# Patient Record
Sex: FEMALE | Race: WHITE | NOT HISPANIC OR LATINO | URBAN - METROPOLITAN AREA
[De-identification: names, ages, dates, MRNs, and addresses within clinical notes are randomized per-mention and may not be internally consistent; named-entity substitution may affect disease eponyms.]

---

## 2020-07-15 ENCOUNTER — LAB OUTSIDE AN ENCOUNTER (OUTPATIENT)
Dept: URBAN - METROPOLITAN AREA CLINIC 105 | Facility: CLINIC | Age: 75
End: 2020-07-15

## 2020-07-15 ENCOUNTER — OFFICE VISIT (OUTPATIENT)
Dept: URBAN - METROPOLITAN AREA CLINIC 105 | Facility: CLINIC | Age: 75
End: 2020-07-15
Payer: COMMERCIAL

## 2020-07-15 DIAGNOSIS — E53.8 VITAMIN B12 DEFICIENCY: ICD-10-CM

## 2020-07-15 DIAGNOSIS — K52.9 CHRONIC DIARRHEA: ICD-10-CM

## 2020-07-15 DIAGNOSIS — D64.9 NORMOCYTIC ANEMIA: ICD-10-CM

## 2020-07-15 DIAGNOSIS — K59.01 CONSTIPATION: ICD-10-CM

## 2020-07-15 DIAGNOSIS — Z90.49 HISTORY OF CHOLECYSTECTOMY: ICD-10-CM

## 2020-07-15 DIAGNOSIS — K21.0 GERD WITH ESOPHAGITIS: ICD-10-CM

## 2020-07-15 DIAGNOSIS — I07.1 TRICUSPID VALVE REGURGITATION: ICD-10-CM

## 2020-07-15 DIAGNOSIS — Z98.84 HISTORY OF ROUX-EN-Y GASTRIC BYPASS: ICD-10-CM

## 2020-07-15 DIAGNOSIS — K63.5 COLON POLYPS: ICD-10-CM

## 2020-07-15 DIAGNOSIS — E55.9 VITAMIN D DEFICIENCY: ICD-10-CM

## 2020-07-15 PROCEDURE — 85044 MANUAL RETICULOCYTE COUNT: CPT | Performed by: INTERNAL MEDICINE

## 2020-07-15 PROCEDURE — 82607 VITAMIN B-12: CPT | Performed by: INTERNAL MEDICINE

## 2020-07-15 PROCEDURE — G8417 CALC BMI ABV UP PARAM F/U: HCPCS | Performed by: INTERNAL MEDICINE

## 2020-07-15 PROCEDURE — 3017F COLORECTAL CA SCREEN DOC REV: CPT | Performed by: INTERNAL MEDICINE

## 2020-07-15 PROCEDURE — 99214 OFFICE O/P EST MOD 30 MIN: CPT | Performed by: INTERNAL MEDICINE

## 2020-07-15 PROCEDURE — G9903 PT SCRN TBCO ID AS NON USER: HCPCS | Performed by: INTERNAL MEDICINE

## 2020-07-15 PROCEDURE — G8427 DOCREV CUR MEDS BY ELIG CLIN: HCPCS | Performed by: INTERNAL MEDICINE

## 2020-07-15 RX ORDER — SODIUM, POTASSIUM,MAG SULFATES 17.5-3.13G
177 ML AS DIRECTED SOLUTION, RECONSTITUTED, ORAL ORAL ONCE
Qty: 1 | Refills: 0 | OUTPATIENT
Start: 2020-07-15 | End: 2020-07-16

## 2020-07-15 RX ORDER — MELATONIN 5 MG
TAKE 2-3 BY MOUTH AT NIGHT TABLET ORAL
Qty: 0 | Refills: 0 | Status: ACTIVE | COMMUNITY
Start: 1900-01-01

## 2020-07-15 NOTE — HPI-OTHER HISTORIES
The patient presents on follow-up for chronic diarrhea.  On 9/4/19, the patient presented in hospital follow-up from 6/25/19. Dr. Spencer called her and said that the antibiotics he prescribed her were not effective against her UTI, so she had to start a new regimen 2 times. She said she obtained budesonide after leaving the hospital, but did not take it. She wanted to wait until her UTI cleared up. She was on Imodium BID and Lomotil 2 pills QD after leaving. Her diarrhea was under control at that point. She went off the Imodium in mid August. She was still taking Lomotil at that point. She experienced mild diarrhea. She stayed off Imodium until last weekend when she started feeling gassy with fecal matter being released. She was feeling a lot better than when she was in the hospital. She started budesonide 3 mg TID 2 weeks ago (she called my office noting diarrhea, so I advised a trial of budesonide). Her diarrhea had worsened instead of improving. She expereienced extreme gas and heartburn. She bought Tums, but had not taken any yet. She took Imodium PRN, and Lomotil QD. She had 20 BMs/day over the weekend. She now has 3-4/day while on Imodium; however, she was having watery, nonformed, "scummy" BMs. She noticed more diarrhea when she ate things that she was not supposed to, like tomatoes and gluten.  She noted previous colonoscopies have been an inadequate prep inspite of tolerating the prep.  She was on a low gluten, low dairy, and low fiber diet.  On 10/9/19, she stated she had constant diarrhea. She had a bowel prep on the 4th. She noted no change in her BMs. She said that today was the first day that she was able to urinate w/o fecal matter coming out. She had 5-7 BMs/day. Since she stopped Imodium and Lomotil, she had diarrhea.  However, today she was better and had only 1 BM/formed.  Her last colonoscopy was in 2016. She said she was given a 3 year follow-up.  On 11/13/19, she said she couldn't take Miralax 1 cap every day because her frequency was too much. She had used it 3x since her last visit (10/23, 10/28, 10/31). She noted a result 7-9 hrs after use. She had 3-8 BMs/day. When she had a BM 8x/day, her stools were more watery and uncontrollable while 3x/day was more formed. She was seeing more formed than nonformed stool. Another disadvantage to Miralax was that it acts as a barrier to eating. 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 She still felt like progress was being made, because she was noting an increased appetite at times and her BMs reflected that. She had been having more heavy BMs that sunk to the bottom of the toilet instead of floating. She said that floating BMs were coming from food she just ate. She rarely drank hot liquids. She didn't drink coffee at all.  On 12/18/19, she says she got another UTI. She was on Macrobid, but it wasn't effective so she switched to Levaquin. She has five days left. She is seeing ID (Debbi Venegas MD). She d/c Miralax about 5 days ago at the instruction of Dr. Venegas's PA in order to see whether her looser stool was caused by Miralax or Levaquin.  She will see her again on Monday. She reports leg edema since her epidural in November. Dr. Raya thought it was related to steroid use. She has another epidural scheduled for Monday. She noted a 50% improvement in her back pain after the procedure. She reports her BMs as "little dribbles" that she isn't even aware of so she often goes to the restroom to check as a result. Sometimes she will have a significant BM, but it's mostly sludge or diarrhea. While on Miralax, she would have 4-5 BMs/day. They were either formed, sludge, or diarrhea. Her BMs were more formed while on Miralax. She also reported more evacuation and felt like her BMs came from "higher up" in her abdomen while on it. She noted less of a dribbling sensation/smear incontinence back then. She is unsure of the improvement in her urgency.  She is not taking Imodium or Lomotil. Dr. Raya advised her to get compression stocks. She is still on sprionolactone, but Dr. Raya wants her switch over to Lasix if kidney function is normal.  On 2/7/20, she noted seeing Dr. Caroline Cotter re fluid loss from diarrhea with a creat 1.6.  She was to have heart surgery re tricuspid valve regurgitation.  She was now taking Miralax 3 heaping teaspoons every 3 days and having less watery diarrhea.  She noted having 4-5 BMs/day described as "formed ribbons."  She stated she had had a dramatic improvement in her diarrhea on Miralax.  Today, she reports unpredictable BMs. She was using Miralax 2 tsp QD, but thought it was too powerful. She wouldn't take it when she went out. She decreased to 1 tsp/day, but still thinks that could be too much. She takes Miralax 3-5 days/week. She sees blood on tissue. She's had 3-4 iron infusions over the last 6 years.   Labs 8/26/19 - Hgb A1C 6.5. 8/20/19 - Creatinine 2.13, GFR 23/26.  CBC normal except for , TSH/free T4 normal.  Iron 132, TIBC 398, Iron sat 33%, ferritin 40.

## 2020-07-19 ENCOUNTER — TELEPHONE ENCOUNTER (OUTPATIENT)
Dept: URBAN - METROPOLITAN AREA CLINIC 92 | Facility: CLINIC | Age: 75
End: 2020-07-19

## 2020-07-19 LAB
A/G RATIO: 2.2
ALBUMIN: 4.7
ALKALINE PHOSPHATASE: 151
ALT (SGPT): 8
AST (SGOT): 14
BASO (ABSOLUTE): 0
BASOS: 0
BILIRUBIN, TOTAL: 0.3
BUN/CREATININE RATIO: 17
BUN: 23
C-REACTIVE PROTEIN, QUANT: <1
CALCIUM: 9.2
CARBON DIOXIDE, TOTAL: 18
CHLORIDE: 107
CREATININE: 1.32
EGFR IF AFRICN AM: 46
EGFR IF NONAFRICN AM: 40
EOS (ABSOLUTE): 0.1
EOS: 2
FERRITIN, SERUM: 13
FOLATE (FOLIC ACID), SERUM: 5.5
GLOBULIN, TOTAL: 2.1
GLUCOSE: 130
HEMATOCRIT: 27.6
HEMATOLOGY COMMENTS:: (no result)
HEMOGLOBIN: 8.4
IMMATURE CELLS: (no result)
IMMATURE GRANS (ABS): (no result)
IMMATURE GRANULOCYTES: (no result)
IRON BIND.CAP.(TIBC): 524
IRON SATURATION: 4
IRON: 20
LYMPHS (ABSOLUTE): 1.7
LYMPHS: 27
Lab: (no result)
MCH: 25.1
MCHC: 30.4
MCV: 83
METHYLMALONIC ACID, SERUM: 173
MONOCYTES(ABSOLUTE): 0.5
MONOCYTES: 8
NEUTROPHILS (ABSOLUTE): 4
NEUTROPHILS: 63
NRBC: (no result)
PLATELETS: 236
POTASSIUM: 5
PROTEIN, TOTAL: 6.8
RBC: 3.34
RDW: 16
RETICULOCYTE COUNT: 1.1
SODIUM: 140
UIBC: 504
VITAMIN B12: 566
WBC: 6.3

## 2020-08-03 ENCOUNTER — OFFICE VISIT (OUTPATIENT)
Dept: URBAN - METROPOLITAN AREA SURGERY CENTER 16 | Facility: SURGERY CENTER | Age: 75
End: 2020-08-03
Payer: COMMERCIAL

## 2020-08-03 ENCOUNTER — TELEPHONE ENCOUNTER (OUTPATIENT)
Dept: URBAN - METROPOLITAN AREA CLINIC 92 | Facility: CLINIC | Age: 75
End: 2020-08-03

## 2020-08-03 DIAGNOSIS — K31.89 ACQUIRED DEFORMITY OF PYLORUS: ICD-10-CM

## 2020-08-03 DIAGNOSIS — B37.81 CANDIDA ESOPHAGITIS: ICD-10-CM

## 2020-08-03 DIAGNOSIS — R19.4 ALTERED BOWEL HABITS: ICD-10-CM

## 2020-08-03 DIAGNOSIS — Z98.84 GASTRIC BYPASS STATUS FOR OBESITY: ICD-10-CM

## 2020-08-03 PROCEDURE — G9937 DIG OR SURV COLSCO: HCPCS | Performed by: INTERNAL MEDICINE

## 2020-08-03 PROCEDURE — G8907 PT DOC NO EVENTS ON DISCHARG: HCPCS | Performed by: INTERNAL MEDICINE

## 2020-08-03 PROCEDURE — 45380 COLONOSCOPY AND BIOPSY: CPT | Performed by: INTERNAL MEDICINE

## 2020-08-03 PROCEDURE — 43239 EGD BIOPSY SINGLE/MULTIPLE: CPT | Performed by: INTERNAL MEDICINE

## 2020-08-03 RX ORDER — MELATONIN 5 MG
TAKE 2-3 BY MOUTH AT NIGHT TABLET ORAL
Qty: 0 | Refills: 0 | Status: ACTIVE | COMMUNITY
Start: 1900-01-01

## 2020-08-03 RX ORDER — OMEPRAZOLE 40 MG/1
1 CAPSULE 30 MINUTES BEFORE MORNING MEAL CAPSULE, DELAYED RELEASE ORAL ONCE A DAY
Qty: 90 | Refills: 3 | OUTPATIENT
Start: 2020-08-03

## 2020-08-07 ENCOUNTER — TELEPHONE ENCOUNTER (OUTPATIENT)
Dept: URBAN - METROPOLITAN AREA CLINIC 92 | Facility: CLINIC | Age: 75
End: 2020-08-07

## 2020-08-07 RX ORDER — FLUCONAZOLE 100 MG/1
1 TABLET TABLET ORAL DAILY
Qty: 14 TABLET | Refills: 0 | OUTPATIENT
Start: 2020-08-07 | End: 2020-08-21

## 2020-09-03 ENCOUNTER — LAB OUTSIDE AN ENCOUNTER (OUTPATIENT)
Dept: URBAN - METROPOLITAN AREA CLINIC 105 | Facility: CLINIC | Age: 75
End: 2020-09-03

## 2020-09-03 ENCOUNTER — OFFICE VISIT (OUTPATIENT)
Dept: URBAN - METROPOLITAN AREA CLINIC 105 | Facility: CLINIC | Age: 75
End: 2020-09-03
Payer: COMMERCIAL

## 2020-09-03 DIAGNOSIS — E55.9 VITAMIN D DEFICIENCY: ICD-10-CM

## 2020-09-03 DIAGNOSIS — K63.5 COLON POLYPS: ICD-10-CM

## 2020-09-03 DIAGNOSIS — Z98.84 HISTORY OF ROUX-EN-Y GASTRIC BYPASS: ICD-10-CM

## 2020-09-03 DIAGNOSIS — K21.0 GERD WITH ESOPHAGITIS: ICD-10-CM

## 2020-09-03 DIAGNOSIS — D50.9 IRON DEFICIENCY ANEMIA, UNSPECIFIED IRON DEFICIENCY ANEMIA TYPE: ICD-10-CM

## 2020-09-03 DIAGNOSIS — K52.9 CHRONIC DIARRHEA: ICD-10-CM

## 2020-09-03 DIAGNOSIS — I07.1 TRICUSPID VALVE REGURGITATION: ICD-10-CM

## 2020-09-03 DIAGNOSIS — K59.01 CONSTIPATION: ICD-10-CM

## 2020-09-03 DIAGNOSIS — E53.8 VITAMIN B12 DEFICIENCY: ICD-10-CM

## 2020-09-03 DIAGNOSIS — Z90.49 HISTORY OF CHOLECYSTECTOMY: ICD-10-CM

## 2020-09-03 PROCEDURE — G8417 CALC BMI ABV UP PARAM F/U: HCPCS | Performed by: INTERNAL MEDICINE

## 2020-09-03 PROCEDURE — G8427 DOCREV CUR MEDS BY ELIG CLIN: HCPCS | Performed by: INTERNAL MEDICINE

## 2020-09-03 PROCEDURE — 3017F COLORECTAL CA SCREEN DOC REV: CPT | Performed by: INTERNAL MEDICINE

## 2020-09-03 PROCEDURE — G9903 PT SCRN TBCO ID AS NON USER: HCPCS | Performed by: INTERNAL MEDICINE

## 2020-09-03 PROCEDURE — 99214 OFFICE O/P EST MOD 30 MIN: CPT | Performed by: INTERNAL MEDICINE

## 2020-09-03 RX ORDER — OMEPRAZOLE 40 MG/1
1 CAPSULE 30 MINUTES BEFORE MORNING MEAL CAPSULE, DELAYED RELEASE ORAL ONCE A DAY
Qty: 90 | Refills: 3 | Status: ACTIVE | COMMUNITY
Start: 2020-08-03

## 2020-09-03 RX ORDER — MELATONIN 5 MG
TAKE 2-3 BY MOUTH AT NIGHT TABLET ORAL
Qty: 0 | Refills: 0 | Status: ACTIVE | COMMUNITY
Start: 1900-01-01

## 2020-09-03 NOTE — HPI-OTHER HISTORIES
The patient presents on follow-up for chronic diarrhea.  On 9/4/19, the patient presented in hospital follow-up from 6/25/19. Dr. Spencer called her and said that the antibiotics he prescribed her were not effective against her UTI, so she had to start a new regimen 2 times. She said she obtained budesonide after leaving the hospital, but did not take it. She wanted to wait until her UTI cleared up. She was on Imodium BID and Lomotil 2 pills QD after leaving. Her diarrhea was under control at that point. She went off the Imodium in mid August. She was still taking Lomotil at that point. She experienced mild diarrhea. She stayed off Imodium until last weekend when she started feeling gassy with fecal matter being released. She was feeling a lot better than when she was in the hospital. She started budesonide 3 mg TID 2 weeks ago (she called my office noting diarrhea, so I advised a trial of budesonide). Her diarrhea had worsened instead of improving. She expereienced extreme gas and heartburn. She bought Tums, but had not taken any yet. She took Imodium PRN, and Lomotil QD. She had 20 BMs/day over the weekend. She now has 3-4/day while on Imodium; however, she was having watery, nonformed, "scummy" BMs. She noticed more diarrhea when she ate things that she was not supposed to, like tomatoes and gluten.  She noted previous colonoscopies have been an inadequate prep inspite of tolerating the prep.  She was on a low gluten, low dairy, and low fiber diet.  On 10/9/19, she stated she had constant diarrhea. She had a bowel prep on the 4th. She noted no change in her BMs. She said that today was the first day that she was able to urinate w/o fecal matter coming out. She had 5-7 BMs/day. Since she stopped Imodium and Lomotil, she had diarrhea.  However, today she was better and had only 1 BM/formed.  Her last colonoscopy was in 2016. She said she was given a 3 year follow-up.  On 11/13/19, she said she couldn't take Miralax 1 cap every day because her frequency was too much. She had used it 3x since her last visit (10/23, 10/28, 10/31). She noted a result 7-9 hrs after use. She had 3-8 BMs/day. When she had a BM 8x/day, her stools were more watery and uncontrollable while 3x/day was more formed. She was seeing more formed than nonformed stool. Another disadvantage to Miralax was that it acts as a barrier to eating. 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 She still felt like progress was being made, because she was noting an increased appetite at times and her BMs reflected that. She had been having more heavy BMs that sunk to the bottom of the toilet instead of floating. She said that floating BMs were coming from food she just ate. She rarely drank hot liquids. She didn't drink coffee at all.  On 12/18/19, she says she got another UTI. She was on Macrobid, but it wasn't effective so she switched to Levaquin. She has five days left. She is seeing ID (Debbi Venegas MD). She d/c Miralax about 5 days ago at the instruction of Dr. Venegas's PA in order to see whether her looser stool was caused by Miralax or Levaquin.  She will see her again on Monday. She reports leg edema since her epidural in November. Dr. Raya thought it was related to steroid use. She has another epidural scheduled for Monday. She noted a 50% improvement in her back pain after the procedure. She reports her BMs as "little dribbles" that she isn't even aware of so she often goes to the restroom to check as a result. Sometimes she will have a significant BM, but it's mostly sludge or diarrhea. While on Miralax, she would have 4-5 BMs/day. They were either formed, sludge, or diarrhea. Her BMs were more formed while on Miralax. She also reported more evacuation and felt like her BMs came from "higher up" in her abdomen while on it. She noted less of a dribbling sensation/smear incontinence back then. She is unsure of the improvement in her urgency.  She is not taking Imodium or Lomotil. Dr. Raya advised her to get compression stocks. She is still on sprionolactone, but Dr. Raya wants her switch over to Lasix if kidney function is normal.  On 2/7/20, she noted seeing Dr. Caroline Cotter re fluid loss from diarrhea with a creat 1.6.  She was to have heart surgery re tricuspid valve regurgitation.  She was now taking Miralax 3 heaping teaspoons every 3 days and having less watery diarrhea.  She noted having 4-5 BMs/day described as "formed ribbons."  She stated she had had a dramatic improvement in her diarrhea on Miralax.  On 7/15/20, she reported unpredictable BMs. She was using Miralax 2 tsp QD, but thought it was too powerful. She wouldn't take it when she went out. She decreased to 1 tsp/day, but still thought that could be too much. She took Miralax 3-5 days/week. She saw blood on tissue. She had had 3-4 iron infusions over the last 6 years.   Today, she says she started on omeprazole 40 mg QD. She continues on Miralax 1 tsp QD. She has 7-10 BMs/day with occasional urgency. In the past month, she has only had 5 episodes of more formed stool (soft). She is not taking iron because it makes her nauseous but she has had 2 iron infusions. Labs in about a week with Abel Monteiro. She will soon have a septoplasty. Cataract surgery scheduled for October. She will be moving to [place], [place].  Labs 7/15/20 - CBC normal except hgb 8.4. CMP normal except creatinine 1.32, alk phos 151, GFR 40. Ferritin 13, TIBC 524, UIBC 504, iron 20, iron sat 4%. CRP, folate, MMA, vit B12, reticulocyte all normal.  8/26/19 - Hgb A1C 6.5. 8/20/19 - Creatinine 2.13, GFR 23/26.  CBC normal except for , TSH/free T4 normal.  Iron 132, TIBC 398, Iron sat 33%, ferritin 40.

## 2020-10-01 ENCOUNTER — OFFICE VISIT (OUTPATIENT)
Dept: URBAN - METROPOLITAN AREA CLINIC 105 | Facility: CLINIC | Age: 75
End: 2020-10-01
Payer: COMMERCIAL

## 2020-10-01 ENCOUNTER — DASHBOARD ENCOUNTERS (OUTPATIENT)
Age: 75
End: 2020-10-01

## 2020-10-01 DIAGNOSIS — Z98.84 HISTORY OF ROUX-EN-Y GASTRIC BYPASS: ICD-10-CM

## 2020-10-01 DIAGNOSIS — K63.5 COLON POLYPS: ICD-10-CM

## 2020-10-01 DIAGNOSIS — D50.9 IRON DEFICIENCY ANEMIA, UNSPECIFIED IRON DEFICIENCY ANEMIA TYPE: ICD-10-CM

## 2020-10-01 DIAGNOSIS — K59.01 CONSTIPATION: ICD-10-CM

## 2020-10-01 PROCEDURE — G8427 DOCREV CUR MEDS BY ELIG CLIN: HCPCS | Performed by: INTERNAL MEDICINE

## 2020-10-01 PROCEDURE — 99213 OFFICE O/P EST LOW 20 MIN: CPT | Performed by: INTERNAL MEDICINE

## 2020-10-01 PROCEDURE — 3017F COLORECTAL CA SCREEN DOC REV: CPT | Performed by: INTERNAL MEDICINE

## 2020-10-01 PROCEDURE — G9903 PT SCRN TBCO ID AS NON USER: HCPCS | Performed by: INTERNAL MEDICINE

## 2020-10-01 PROCEDURE — G8482 FLU IMMUNIZE ORDER/ADMIN: HCPCS | Performed by: INTERNAL MEDICINE

## 2020-10-01 PROCEDURE — G8417 CALC BMI ABV UP PARAM F/U: HCPCS | Performed by: INTERNAL MEDICINE

## 2020-10-01 PROCEDURE — 1036F TOBACCO NON-USER: CPT | Performed by: INTERNAL MEDICINE

## 2020-10-01 RX ORDER — OMEPRAZOLE 40 MG/1
1 CAPSULE 30 MINUTES BEFORE MORNING MEAL CAPSULE, DELAYED RELEASE ORAL ONCE A DAY
Qty: 90 | Refills: 3 | Status: DISCONTINUED | COMMUNITY
Start: 2020-08-03

## 2020-10-01 RX ORDER — MELATONIN 5 MG
TAKE 2-3 BY MOUTH AT NIGHT TABLET ORAL
Qty: 0 | Refills: 0 | Status: DISCONTINUED | COMMUNITY
Start: 1900-01-01

## 2020-10-01 NOTE — HPI-OTHER HISTORIES
The patient presents on follow-up for chronic diarrhea.  On 9/4/19, the patient presented in hospital follow-up from 6/25/19. Dr. Spencer called her and said that the antibiotics he prescribed her were not effective against her UTI, so she had to start a new regimen 2 times. She said she obtained budesonide after leaving the hospital, but did not take it. She wanted to wait until her UTI cleared up. She was on Imodium BID and Lomotil 2 pills QD after leaving. Her diarrhea was under control at that point. She went off the Imodium in mid August. She was still taking Lomotil at that point. She experienced mild diarrhea. She stayed off Imodium until last weekend when she started feeling gassy with fecal matter being released. She was feeling a lot better than when she was in the hospital. She started budesonide 3 mg TID 2 weeks ago (she called my office noting diarrhea, so I advised a trial of budesonide). Her diarrhea had worsened instead of improving. She expereienced extreme gas and heartburn. She bought Tums, but had not taken any yet. She took Imodium PRN, and Lomotil QD. She had 20 BMs/day over the weekend. She now has 3-4/day while on Imodium; however, she was having watery, nonformed, "scummy" BMs. She noticed more diarrhea when she ate things that she was not supposed to, like tomatoes and gluten.  She noted previous colonoscopies have been an inadequate prep inspite of tolerating the prep.  She was on a low gluten, low dairy, and low fiber diet.  On 10/9/19, she stated she had constant diarrhea. She had a bowel prep on the 4th. She noted no change in her BMs. She said that today was the first day that she was able to urinate w/o fecal matter coming out. She had 5-7 BMs/day. Since she stopped Imodium and Lomotil, she had diarrhea.  However, today she was better and had only 1 BM/formed.  Her last colonoscopy was in 2016. She said she was given a 3 year follow-up.  On 11/13/19, she said she couldn't take Miralax 1 cap every day because her frequency was too much. She had used it 3x since her last visit (10/23, 10/28, 10/31). She noted a result 7-9 hrs after use. She had 3-8 BMs/day. When she had a BM 8x/day, her stools were more watery and uncontrollable while 3x/day was more formed. She was seeing more formed than nonformed stool. Another disadvantage to Miralax was that it acts as a barrier to eating. 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 She still felt like progress was being made, because she was noting an increased appetite at times and her BMs reflected that. She had been having more heavy BMs that sunk to the bottom of the toilet instead of floating. She said that floating BMs were coming from food she just ate. She rarely drank hot liquids. She didn't drink coffee at all.  On 12/18/19, she says she got another UTI. She was on Macrobid, but it wasn't effective so she switched to Levaquin. She has five days left. She is seeing ID (Debbi Venegas MD). She d/c Miralax about 5 days ago at the instruction of Dr. Venegas's PA in order to see whether her looser stool was caused by Miralax or Levaquin.  She will see her again on Monday. She reports leg edema since her epidural in November. Dr. Raya thought it was related to steroid use. She has another epidural scheduled for Monday. She noted a 50% improvement in her back pain after the procedure. She reports her BMs as "little dribbles" that she isn't even aware of so she often goes to the restroom to check as a result. Sometimes she will have a significant BM, but it's mostly sludge or diarrhea. While on Miralax, she would have 4-5 BMs/day. They were either formed, sludge, or diarrhea. Her BMs were more formed while on Miralax. She also reported more evacuation and felt like her BMs came from "higher up" in her abdomen while on it. She noted less of a dribbling sensation/smear incontinence back then. She is unsure of the improvement in her urgency.  She is not taking Imodium or Lomotil. Dr. Raya advised her to get compression stocks. She is still on sprionolactone, but Dr. Raya wants her switch over to Lasix if kidney function is normal.  On 2/7/20, she noted seeing Dr. Caroline Cotter re fluid loss from diarrhea with a creat 1.6.  She was to have heart surgery re tricuspid valve regurgitation.  She was now taking Miralax 3 heaping teaspoons every 3 days and having less watery diarrhea.  She noted having 4-5 BMs/day described as "formed ribbons."  She stated she had had a dramatic improvement in her diarrhea on Miralax.  On 7/15/20, she reported unpredictable BMs. She was using Miralax 2 tsp QD, but thought it was too powerful. She wouldn't take it when she went out. She decreased to 1 tsp/day, but still thought that could be too much. She took Miralax 3-5 days/week. She saw blood on tissue. She had had 3-4 iron infusions over the last 6 years.   On 9/3/20, she said she started on omeprazole 40 mg QD. She continued on Miralax 1 tsp QD. She had 7-10 BMs/day with occasional urgency. In the past month, she had only had 5 episodes of more formed stool (soft). She was not taking iron because it made her nauseous but she had had 2 iron infusions. Labs in about a week with Abel Monteiro. She would soon have a septoplasty. Cataract surgery scheduled for October. She would be moving to [place], [place].  Today, she says she will be moving to [place], [place] on Nov 5. She is off omeprazole and Miralax. Diarrhea frequency has gone down to 2-3x/week (watery and urgent). She says her bowel habit is going well. She will try Culturelle. Her septoplasty went well. She cancelled her cataract surgery. Her doctor has put on Zyrtec which has helped with congestion.  Labs 7/15/20 - CBC normal except hgb 8.4. CMP normal except creatinine 1.32, alk phos 151, GFR 40. Ferritin 13, TIBC 524, UIBC 504, iron 20, iron sat 4%. CRP, folate, MMA, vit B12, reticulocyte all normal.  8/26/19 - Hgb A1C 6.5. 8/20/19 - Creatinine 2.13, GFR 23/26.  CBC normal except for , TSH/free T4 normal.  Iron 132, TIBC 398, Iron sat 33%, ferritin 40.